# Patient Record
Sex: FEMALE | URBAN - METROPOLITAN AREA
[De-identification: names, ages, dates, MRNs, and addresses within clinical notes are randomized per-mention and may not be internally consistent; named-entity substitution may affect disease eponyms.]

---

## 2018-07-24 ENCOUNTER — APPOINTMENT (OUTPATIENT)
Age: 43
Setting detail: DERMATOLOGY
End: 2018-07-24

## 2018-07-24 DIAGNOSIS — B35.1 TINEA UNGUIUM: ICD-10-CM

## 2018-07-24 PROCEDURE — OTHER NAIL CLIPPING FOR PATHOLOGY: OTHER

## 2018-07-24 PROCEDURE — OTHER ORDER TESTS: OTHER

## 2018-07-24 PROCEDURE — OTHER PRESCRIPTION: OTHER

## 2018-07-24 PROCEDURE — OTHER MIPS QUALITY: OTHER

## 2018-07-24 PROCEDURE — 99202 OFFICE O/P NEW SF 15 MIN: CPT

## 2018-07-24 RX ORDER — FLUCONAZOLE 150 MG/1
150 TABLET ORAL
Qty: 4 | Refills: 1 | Status: ERX | COMMUNITY
Start: 2018-07-24

## 2018-07-24 RX ORDER — CLOTRIMAZOLE 10 MG/G
1 CREAM TOPICAL BID
Qty: 1 | Refills: 3 | Status: ERX | COMMUNITY
Start: 2018-07-24

## 2018-07-24 ASSESSMENT — LOCATION DETAILED DESCRIPTION DERM: LOCATION DETAILED: RIGHT MIDDLE FINGERNAIL

## 2018-07-24 ASSESSMENT — LOCATION ZONE DERM: LOCATION ZONE: FINGERNAIL

## 2018-07-24 ASSESSMENT — LOCATION SIMPLE DESCRIPTION DERM: LOCATION SIMPLE: RIGHT MIDDLE FINGERNAIL

## 2018-07-24 NOTE — PROCEDURE: MIPS QUALITY
Detail Level: Detailed
Quality 226: Preventive Care And Screening: Tobacco Use: Screening And Cessation Intervention: Patient screened for tobacco and never smoked
Quality 110: Preventive Care And Screening: Influenza Immunization: Influenza Immunization Administered during Influenza season
Quality 47: Advance Care Plan: Advance care planning not documented, reason not otherwise specified.
Quality 111:Pneumonia Vaccination Status For Older Adults: Pneumococcal Vaccination not Administered or Previously Received, Reason not Otherwise Specified

## 2018-08-07 ENCOUNTER — APPOINTMENT (OUTPATIENT)
Age: 43
Setting detail: DERMATOLOGY
End: 2018-08-07

## 2018-08-07 DIAGNOSIS — B35.1 TINEA UNGUIUM: ICD-10-CM

## 2018-08-07 PROCEDURE — 99212 OFFICE O/P EST SF 10 MIN: CPT

## 2018-08-07 PROCEDURE — OTHER COUNSELING: OTHER

## 2018-08-07 PROCEDURE — OTHER MIPS QUALITY: OTHER

## 2018-08-07 NOTE — PROCEDURE: MIPS QUALITY
Detail Level: Detailed
Quality 265: Biopsy Follow-Up: Biopsy results reviewed, communicated, tracked, and documented
Quality 226: Preventive Care And Screening: Tobacco Use: Screening And Cessation Intervention: Patient screened for tobacco and never smoked
Respiratory